# Patient Record
Sex: MALE | Race: WHITE | NOT HISPANIC OR LATINO
[De-identification: names, ages, dates, MRNs, and addresses within clinical notes are randomized per-mention and may not be internally consistent; named-entity substitution may affect disease eponyms.]

---

## 2018-11-14 ENCOUNTER — RECORDS - HEALTHEAST (OUTPATIENT)
Dept: ADMINISTRATIVE | Facility: OTHER | Age: 63
End: 2018-11-14

## 2018-11-14 ENCOUNTER — AMBULATORY - HEALTHEAST (OUTPATIENT)
Dept: ADMINISTRATIVE | Facility: CLINIC | Age: 63
End: 2018-11-14

## 2018-11-14 DIAGNOSIS — L60.9 NAIL ABNORMALITIES: ICD-10-CM

## 2018-12-06 ENCOUNTER — OFFICE VISIT - HEALTHEAST (OUTPATIENT)
Dept: PODIATRY | Facility: CLINIC | Age: 63
End: 2018-12-06

## 2018-12-06 DIAGNOSIS — L60.0 INGROWN NAIL: ICD-10-CM

## 2018-12-06 DIAGNOSIS — L03.039 PARONYCHIA OF TOE, UNSPECIFIED LATERALITY: ICD-10-CM

## 2018-12-17 ENCOUNTER — OFFICE VISIT - HEALTHEAST (OUTPATIENT)
Dept: PODIATRY | Facility: CLINIC | Age: 63
End: 2018-12-17

## 2018-12-17 DIAGNOSIS — L60.0 INGROWN NAIL: ICD-10-CM

## 2018-12-17 ASSESSMENT — MIFFLIN-ST. JEOR: SCORE: 1619.99

## 2019-01-10 ENCOUNTER — RECORDS - HEALTHEAST (OUTPATIENT)
Dept: ADMINISTRATIVE | Facility: OTHER | Age: 64
End: 2019-01-10

## 2019-01-15 ENCOUNTER — HOSPITAL ENCOUNTER (OUTPATIENT)
Dept: CARDIOLOGY | Facility: CLINIC | Age: 64
Discharge: HOME | End: 2019-01-15

## 2019-01-15 DIAGNOSIS — R06.02 SOB (SHORTNESS OF BREATH): ICD-10-CM

## 2019-01-15 LAB
CV STRESS CURRENT BP HE: NORMAL
CV STRESS CURRENT HR HE: 108
CV STRESS CURRENT HR HE: 111
CV STRESS CURRENT HR HE: 115
CV STRESS CURRENT HR HE: 116
CV STRESS CURRENT HR HE: 119
CV STRESS CURRENT HR HE: 121
CV STRESS CURRENT HR HE: 124
CV STRESS CURRENT HR HE: 124
CV STRESS CURRENT HR HE: 140
CV STRESS CURRENT HR HE: 149
CV STRESS CURRENT HR HE: 149
CV STRESS CURRENT HR HE: 71
CV STRESS CURRENT HR HE: 79
CV STRESS CURRENT HR HE: 80
CV STRESS CURRENT HR HE: 84
CV STRESS CURRENT HR HE: 87
CV STRESS CURRENT HR HE: 87
CV STRESS CURRENT HR HE: 88
CV STRESS CURRENT HR HE: 89
CV STRESS CURRENT HR HE: 92
CV STRESS CURRENT HR HE: 92
CV STRESS CURRENT HR HE: 93
CV STRESS CURRENT HR HE: 95
CV STRESS CURRENT HR HE: 95
CV STRESS DEVIATION TIME HE: NORMAL
CV STRESS ECHO PERCENT HR HE: NORMAL
CV STRESS EXERCISE STAGE HE: NORMAL
CV STRESS FINAL RESTING BP HE: NORMAL
CV STRESS FINAL RESTING HR HE: 88
CV STRESS MAX HR HE: 149
CV STRESS MAX TREADMILL GRADE HE: 16
CV STRESS MAX TREADMILL SPEED HE: 4.2
CV STRESS PEAK DIA BP HE: NORMAL
CV STRESS PEAK SYS BP HE: NORMAL
CV STRESS PHASE HE: NORMAL
CV STRESS PROTOCOL HE: NORMAL
CV STRESS RESTING PT POSITION HE: NORMAL
CV STRESS RESTING PT POSITION HE: NORMAL
CV STRESS ST DEVIATION AMOUNT HE: NORMAL
CV STRESS ST DEVIATION ELEVATION HE: NORMAL
CV STRESS ST EVELATION AMOUNT HE: NORMAL
CV STRESS TEST TYPE HE: NORMAL
CV STRESS TOTAL STAGE TIME MIN 1 HE: NORMAL
ECHO EJECTION FRACTION ESTIMATED: 55 %
STRESS ECHO BASELINE BP: NORMAL
STRESS ECHO BASELINE HR: 78
STRESS ECHO CALCULATED PERCENT HR: 94 %
STRESS ECHO LAST STRESS BP: NORMAL
STRESS ECHO LAST STRESS HR: 149
STRESS ECHO POST ESTIMATED WORKLOAD: 12.1
STRESS ECHO POST EXERCISE DUR MIN: 10
STRESS ECHO POST EXERCISE DUR SEC: 45
STRESS ECHO TARGET HR: 135

## 2021-06-02 VITALS — HEIGHT: 73 IN | WEIGHT: 170 LBS | BODY MASS INDEX: 22.53 KG/M2

## 2021-06-22 NOTE — PROGRESS NOTES
Podiatry Progress Note        ASSESSMENT: S/P nail avulsion bilateral hallux      TREATMENT:  -Surgical sites are progressing well. He will continue with band aids until drainage stops.   -He will monitor for concerns and return as symptoms dictate.      Carlos Edge [facility]      HPI: Kumar Silvestre was seen again today s/p permanent nail avulsion bilateral hallux. Doing well. Denies pain.     History reviewed. No pertinent past medical history.    No Known Allergies    No current outpatient medications on file.    Review of Systems - Negative      OBJECTIVE:  Appearance: alert, well appearing, and in no distress.    Vitals:    12/17/18 0855   BP: 106/80       Surgical site on the bilateral hallux has intact nail bed. No erythema. Neurovascular status intact bilateral.     Imaging: None

## 2021-06-22 NOTE — PROGRESS NOTES
"FOOT AND ANKLE SURGERY/PODIATRY Progress Note        ASSESSMENT:   Ingrown Nail medial border bilateral hallux      TREATMENT:  -There is an ingrown nail on the medial border bilateral hallux. We discussed both temporary and permanent nail removal today. Because she has had a temporary nail avulsion procedure performed in the past, I recommend a permanent nail avulsion today. He understands that the nail may become symptomatic in 2% of cases. She consents to the procedure today.  -5 cc of 1% lidocaine plain was injected into the bilateral hallux. The digits were cleansed with betadine swab. Following the application of a digital Tourni-cot the following procedures were performed.  Attention was directed to the medial border of the bilateral hallux toenail where utilizing an [place] elevator and an English anvil nail splitter the nail was split from distal to proximal to the level of the epionychium.  Next the offending border was removed.  3,30 second applications of phenol were applied to the matrix.  The wound was then flushed with copious amounts of alcohol. The Tourni-cot was removed. A dressing was applied comprising of silvadene, 2x2 and 1\" coban wrap.  The tourniquet was removed and normal color returned.    -Post-op instructions were dispensed. All questions invited and answered.   -I would like to see the patient again in one week for follow-up.     Carlos Edge, [facility]       HPI: I was asked to see Kumar Silvestre today for ingrown nails on both hallux toes. He describes having on-going pain along the medial border of both hallux and removing the ingrown nails himself.     No past medical history on file.    No Known Allergies    No current outpatient medications on file.    Review of Systems - per HPI, all others negative      OBJECTIVE:  Appearance: alert, well appearing, and in no distress.    There were no vitals filed for this visit.    Vascular: Dorsalis pedis and posterior tibial " pulses are palpable. There is pedal hair growth.  CFT < 3 sec from anterior tibial surface to distal digits bilaterally. There is no appreciable edema noted.  Dermatologic: Incurvated nail border along the medial border, hallux bilateral. No erythema bilateral.   Neurologic: All epicritic and proprioceptive sensations are grossly intact bilaterally.   Musculoskeletal: Mild pain medial border hallux bilateral.     Imaging: None